# Patient Record
Sex: MALE | Race: WHITE | NOT HISPANIC OR LATINO
[De-identification: names, ages, dates, MRNs, and addresses within clinical notes are randomized per-mention and may not be internally consistent; named-entity substitution may affect disease eponyms.]

---

## 2018-03-01 ENCOUNTER — RESULT REVIEW (OUTPATIENT)
Age: 68
End: 2018-03-01

## 2018-08-13 ENCOUNTER — EMERGENCY (EMERGENCY)
Facility: HOSPITAL | Age: 68
LOS: 1 days | Discharge: HOME | End: 2018-08-13
Attending: EMERGENCY MEDICINE | Admitting: EMERGENCY MEDICINE
Payer: COMMERCIAL

## 2018-08-13 VITALS
DIASTOLIC BLOOD PRESSURE: 79 MMHG | OXYGEN SATURATION: 99 % | RESPIRATION RATE: 17 BRPM | SYSTOLIC BLOOD PRESSURE: 131 MMHG | TEMPERATURE: 98 F | WEIGHT: 166.89 LBS | HEART RATE: 58 BPM

## 2018-08-13 VITALS
HEART RATE: 61 BPM | RESPIRATION RATE: 17 BRPM | OXYGEN SATURATION: 99 % | SYSTOLIC BLOOD PRESSURE: 144 MMHG | TEMPERATURE: 98 F | DIASTOLIC BLOOD PRESSURE: 73 MMHG

## 2018-08-13 DIAGNOSIS — Z88.8 ALLERGY STATUS TO OTHER DRUGS, MEDICAMENTS AND BIOLOGICAL SUBSTANCES: ICD-10-CM

## 2018-08-13 DIAGNOSIS — E78.5 HYPERLIPIDEMIA, UNSPECIFIED: ICD-10-CM

## 2018-08-13 DIAGNOSIS — R42 DIZZINESS AND GIDDINESS: ICD-10-CM

## 2018-08-13 LAB
ALBUMIN SERPL ELPH-MCNC: 4.6 G/DL — SIGNIFICANT CHANGE UP (ref 3.3–5)
ALP SERPL-CCNC: 58 U/L — SIGNIFICANT CHANGE UP (ref 40–120)
ALT FLD-CCNC: 26 U/L — SIGNIFICANT CHANGE UP (ref 10–45)
ANION GAP SERPL CALC-SCNC: 14 MMOL/L — SIGNIFICANT CHANGE UP (ref 5–17)
AST SERPL-CCNC: 19 U/L — SIGNIFICANT CHANGE UP (ref 10–40)
BASOPHILS NFR BLD AUTO: 0.4 % — SIGNIFICANT CHANGE UP (ref 0–2)
BILIRUB SERPL-MCNC: 0.8 MG/DL — SIGNIFICANT CHANGE UP (ref 0.2–1.2)
BUN SERPL-MCNC: 20 MG/DL — SIGNIFICANT CHANGE UP (ref 7–23)
CALCIUM SERPL-MCNC: 9.5 MG/DL — SIGNIFICANT CHANGE UP (ref 8.4–10.5)
CHLORIDE SERPL-SCNC: 101 MMOL/L — SIGNIFICANT CHANGE UP (ref 96–108)
CO2 SERPL-SCNC: 22 MMOL/L — SIGNIFICANT CHANGE UP (ref 22–31)
CREAT SERPL-MCNC: 0.97 MG/DL — SIGNIFICANT CHANGE UP (ref 0.5–1.3)
EOSINOPHIL NFR BLD AUTO: 1.7 % — SIGNIFICANT CHANGE UP (ref 0–6)
GLUCOSE SERPL-MCNC: 106 MG/DL — HIGH (ref 70–99)
HCT VFR BLD CALC: 40.9 % — SIGNIFICANT CHANGE UP (ref 39–50)
HGB BLD-MCNC: 13.8 G/DL — SIGNIFICANT CHANGE UP (ref 13–17)
LYMPHOCYTES # BLD AUTO: 23.3 % — SIGNIFICANT CHANGE UP (ref 13–44)
MCHC RBC-ENTMCNC: 29.9 PG — SIGNIFICANT CHANGE UP (ref 27–34)
MCHC RBC-ENTMCNC: 33.7 G/DL — SIGNIFICANT CHANGE UP (ref 32–36)
MCV RBC AUTO: 88.7 FL — SIGNIFICANT CHANGE UP (ref 80–100)
MONOCYTES NFR BLD AUTO: 5.5 % — SIGNIFICANT CHANGE UP (ref 2–14)
NEUTROPHILS NFR BLD AUTO: 69.1 % — SIGNIFICANT CHANGE UP (ref 43–77)
PLATELET # BLD AUTO: 178 K/UL — SIGNIFICANT CHANGE UP (ref 150–400)
POTASSIUM SERPL-MCNC: 4 MMOL/L — SIGNIFICANT CHANGE UP (ref 3.5–5.3)
POTASSIUM SERPL-SCNC: 4 MMOL/L — SIGNIFICANT CHANGE UP (ref 3.5–5.3)
PROT SERPL-MCNC: 7.3 G/DL — SIGNIFICANT CHANGE UP (ref 6–8.3)
RBC # BLD: 4.61 M/UL — SIGNIFICANT CHANGE UP (ref 4.2–5.8)
RBC # FLD: 12.5 % — SIGNIFICANT CHANGE UP (ref 10.3–16.9)
SODIUM SERPL-SCNC: 137 MMOL/L — SIGNIFICANT CHANGE UP (ref 135–145)
TROPONIN T SERPL-MCNC: <0.01 NG/ML — SIGNIFICANT CHANGE UP (ref 0–0.01)
WBC # BLD: 5.3 K/UL — SIGNIFICANT CHANGE UP (ref 3.8–10.5)
WBC # FLD AUTO: 5.3 K/UL — SIGNIFICANT CHANGE UP (ref 3.8–10.5)

## 2018-08-13 PROCEDURE — 84484 ASSAY OF TROPONIN QUANT: CPT

## 2018-08-13 PROCEDURE — 99284 EMERGENCY DEPT VISIT MOD MDM: CPT

## 2018-08-13 PROCEDURE — 99284 EMERGENCY DEPT VISIT MOD MDM: CPT | Mod: 25

## 2018-08-13 PROCEDURE — 70450 CT HEAD/BRAIN W/O DYE: CPT | Mod: 26

## 2018-08-13 PROCEDURE — 70450 CT HEAD/BRAIN W/O DYE: CPT

## 2018-08-13 PROCEDURE — 82962 GLUCOSE BLOOD TEST: CPT

## 2018-08-13 PROCEDURE — 85025 COMPLETE CBC W/AUTO DIFF WBC: CPT

## 2018-08-13 PROCEDURE — 80053 COMPREHEN METABOLIC PANEL: CPT

## 2018-08-13 PROCEDURE — 36415 COLL VENOUS BLD VENIPUNCTURE: CPT

## 2018-08-13 RX ORDER — DIAZEPAM 5 MG
2 TABLET ORAL ONCE
Qty: 0 | Refills: 0 | Status: DISCONTINUED | OUTPATIENT
Start: 2018-08-13 | End: 2018-08-13

## 2018-08-13 RX ORDER — DIAZEPAM 5 MG
0.5 TABLET ORAL
Qty: 5 | Refills: 0 | OUTPATIENT
Start: 2018-08-13 | End: 2018-08-17

## 2018-08-13 RX ADMIN — Medication 2 MILLIGRAM(S): at 14:30

## 2018-08-13 NOTE — ED ADULT NURSE NOTE - NSIMPLEMENTINTERV_GEN_ALL_ED
Implemented All Universal Safety Interventions:  [employer] to call system. Call bell, personal items and telephone within reach. Instruct patient to call for assistance. Room bathroom lighting operational. Non-slip footwear when patient is off stretcher. Physically safe environment: no spills, clutter or unnecessary equipment. Stretcher in lowest position, wheels locked, appropriate side rails in place.

## 2018-08-13 NOTE — ED PROVIDER NOTE - OBJECTIVE STATEMENT
67 yoM with pmh of vertigo, thyroid nodule, gout, HLD presenting after a vertigo episode. Pt started having vertigo in July, went to PCP and was started on Valium and flexeril but took them only once given side effects of cloudiness. Pt since has been having vertigo episodes but today's episode was much worse and pt decided to come to ER. Describes as head spinning. No headache, no vision changes. Chronic hearing loss and tinnitus with no acute changes. No dysarthria or extremity numbness. Did endorse chest pain in AM when symptoms started. 67 yoM with pmh of vertigo, thyroid nodule, gout, HLD presenting after a vertigo episode. Pt started having vertigo in July, went to PCP and was started on Valium and flexeril but took them only once given side effects of cloudiness. Pt since has been having vertigo episodes progressively worsening over past 4 days, with today's episode worse and pt decided to come to ER. Describes as head spinning. Pt had previously been doing exercises as rx'd by his ENT for positional vertigo, and recently stopped exercises in past few days.   No headache, no vision changes. Chronic hearing loss and tinnitus with no acute changes. No dysarthria or extremity numbness. Did endorse chest pain in AM when symptoms started.

## 2018-08-13 NOTE — ED PROVIDER NOTE - MEDICAL DECISION MAKING DETAILS
Symptoms likely 2/2 BPPV. WIll obtain CT head to r/o structural abnormalities Symptoms likely 2/2 BPPV. WIll obtain CT head to r/o structural abnormalities.  Unlikely stroke given rapidness of on-off symptoms with head position changes.   Pt has been diagnosed w/ BPPV in past few weeks and states this is similar and has not been taking his medications due to not liking how they made him feel. vertigo type symptoms, better when he takes his rx'd meds and does his home exercises as rx'd by his ENT. However, has not done them recently and now symptoms returning and worse. No worse with neck movement, his head, but is worse when sitting up or laying back. Symptoms likely 2/2 BPPV as improved w/ his previous medications. WIll obtain CT head to r/o structural abnormalities.  Unlikely stroke given rapidness of on-off symptoms with head position changes.   Pt has been diagnosed w/ BPPV in past few weeks and states this is similar and has not been taking his medications due to not liking how they made him feel. I think it is less likely this is a posterior circulation or stroke given pt has had these symptoms intermittently for months, the improve w/ his BPPV meds and exercises, and they are acutely worse at onset then improve after short bit. Does not have symptoms at rest.

## 2018-08-13 NOTE — ED PROVIDER NOTE - PHYSICAL EXAMINATION
Constitutional: WDWN resting comfortably in bed; NAD  Head: NC/AT  Eyes: PERRL, EOMI, clear conjunctiva  ENT: no nasal discharge; uvula midline, no oropharyngeal erythema or exudates; MMM  Neck: supple; no JVD or thyromegaly  Respiratory: CTA B/L; no W/R/R, no retractions  Cardiac: +S1/S2; RRR; no M/R/G; PMI non-displaced  Gastrointestinal: soft, NT/ND; no rebound or guarding; +BSx4  Genitourinary: normal external genitalia  Back: spine midline, no bony tenderness or step-offs; no CVAT B/L  Extremities: WWP, no clubbing or cyanosis; no peripheral edema  Musculoskeletal: NROM x4; no joint swelling, tenderness or erythema  Vascular: 2+ radial, femoral, DP/PT pulses B/L  Dermatologic: skin warm, dry and intact; no rashes, wounds, or scars  Lymphatic: no submandibular or cervical LAD  Neurologic: AAOx3; CNII-XII grossly intact; no nystagmus. Dizziness worse with neck motion. Dizziness worse with standing. no other focal deficits.   Psychiatric: affect and characteristics of appearance, verbalizations, behaviors are appropriate Constitutional: WDWN resting comfortably in bed; NAD  Head: NC/AT  Eyes: PERRL, EOMI, clear conjunctiva  ENT: no nasal discharge; uvula midline, no oropharyngeal erythema or exudates; MMM  Neck: supple; no JVD or thyromegaly  Respiratory: CTA B/L; no W/R/R, no retractions  Cardiac: +S1/S2; RRR; no M/R/G; PMI non-displaced  Gastrointestinal: soft, NT/ND; no rebound or guarding; +BSx4  Genitourinary: normal external genitalia  Back: spine midline, no bony tenderness or step-offs; no CVAT B/L  Extremities: WWP, no clubbing or cyanosis; no peripheral edema  Musculoskeletal: NROM x4; no joint swelling, tenderness or erythema  Vascular: 2+ radial, femoral, DP/PT pulses B/L  Dermatologic: skin warm, dry and intact; no rashes, wounds, or scars  Lymphatic: no submandibular or cervical LAD  Neurologic: AAOx3; CNII-XII grossly intact; no nystagmus. Dizziness worse with neck motion. Dizziness worse with standing. no other focal deficits. Is able to ambulate with minimal acceptance.   Psychiatric: affect and characteristics of appearance, verbalizations, behaviors are appropriate

## 2018-08-13 NOTE — ED ADULT NURSE NOTE - OBJECTIVE STATEMENT
68 y/o male c/o dizziness since 1025am this morning. reports he felt like his head was spinning and he had mild nausea at the time with no vomit. denies headache, vision changes NAD, VSS, speaking in full sentences, neuro/sensory intact, face symmetrical, gait steady. pmhx vertigo.

## 2018-08-13 NOTE — ED ADULT TRIAGE NOTE - CHIEF COMPLAINT QUOTE
Pt c/o worsening dizziness "my head is spinning" and feeling unsteady when walking since 1030am. Pt states he's 70% deaf in both ears and has been having dizziness for two months, put on Valium and another medication but it didn't help. Also c/o chest discomfort in triage. Pt states today felt worse than he's ever had. FSG 95

## 2018-08-24 PROBLEM — Z00.00 ENCOUNTER FOR PREVENTIVE HEALTH EXAMINATION: Status: ACTIVE | Noted: 2018-08-24

## 2018-10-12 ENCOUNTER — APPOINTMENT (OUTPATIENT)
Dept: NEUROLOGY | Facility: CLINIC | Age: 68
End: 2018-10-12

## 2019-08-08 NOTE — ED ADULT NURSE NOTE - NSFALLRSKUNASSIST_ED_ALL_ED
Physical Therapy Daily Treatment    Visit Count: 4/9   Plan of Care: 7/29/2019 Through: 12/16/2019  Insurance Information:   WEA  Evaluation Only    9 VISITS FROM 7/29/19-9/27/19 WHICH INCLUDES THE EVAL. Referred by:  Cristino Cordero DO; Next provider visit (if known/scheduled): 8/5/19  Medical Diagnosis (from order):       Diagnosis Information    Â       Â  Diagnosis    Â  V58.89 (ICD-9-CM) - S83.232D (ICD-10-CM) - Complex tear of medial meniscus of left knee as current injury, subsequent encounter    Â       Â    Â   Treatment Diagnosis: knee symptoms with increased pain/symptoms, impaired posture, impaired strength, impaired range of motion, impaired muscle length/flexibility, increased swelling, impaired joint mobility/play, impaired gait, impaired activity tolerance  Â   Date of Surgery: 7/25/2019 Surgery Performed: Left knee arthroscopy with partial medial menisectomy, chondroplasty and synovectomy   - Left and Meniscectomy - Left  Physician Guidelines/Precautions: none       SUBJECTIVE     Current Pain (0-10 scale): 5/10  Reports increased left knee soreness today  Reports left knee felt pretty good after last session  Improved sleeping ability last night    OBJECTIVE       Treatment   Therapeutic Exercise:   Recumbent bike full revolutions 6 minutes seat position 9  Slant board calf stretch  Leg press left Lower Extremity 40, 55 pounds   Standing hip abduction each Lower Extremity against yellow theraband  Side stepping against yellow theraband   Stair calf stretch  Leg press left Lower Extremity 40, 55 pounds -- cues on form and positioning  Wall sits -- added to home exercise program   Partial squats   4 inch steps downs    Neuromuscular Reeducation:  Single leg balance   Tandem standing eyes closed   AirEx standing balance narrow base of support eyes closed   AirEx Single leg balance right and left Lower Extremity   Blue fitter board medial and lateral / balance  White short balance board medial and lateral / balance     Skilled input: verbal instruction/cues form and execution of exercise; modified patient's home exercise program below    Home Program:     straight leg raise   heel slides   Short arc quadriceps       Bike  Hamstring heel cord strap stretch  Single leg balance with progression to opposite leg swings and eyes closed   Wall sits  Tandem standing balance eyes closed   Partial squats  Side stepping against theraband   Standing hip abduction against theraband      Writer verbally educated the patient and received verbal consent from the patient on hand placement, positioning of patient, and techniques to be performed today including hand placement and palpation for techniques as described above and how they are pertinent to the patient's plan of care. Suggestions for next session as indicated:  Progress with closed kinetic chain strengthening   Passive Range of Motion   Hip strengthening   Proprioceptive training    ASSESSMENT   Proprioceptive control improved during today's session  Improved exercise tolerance   quadriceps fatigue noted  symptoms decreased during session  Pain after treatment (patient reported, 0-10 scale): 0  Result of above outlined education: Verbalizes understanding and Demonstrates understanding   Handout provided of patient's home exercise program.      THERAPY DAILY BILLING   Insurance: WEA 2.  N/A    Evaluation Procedures:  No evaluation codes were used on this date of service    Timed Procedures:  Neuromuscular Re-Education, 30 minutes  Therapeutic Exercise, 15 minutes    Untimed Procedures:  No untimed codes were used on this date of service    Total Treatment Time: 45 minutes no

## 2019-09-11 PROBLEM — M10.9 GOUT, UNSPECIFIED: Chronic | Status: ACTIVE | Noted: 2018-08-13

## 2019-09-11 PROBLEM — R42 DIZZINESS AND GIDDINESS: Chronic | Status: ACTIVE | Noted: 2018-08-13

## 2019-09-11 PROBLEM — E04.1 NONTOXIC SINGLE THYROID NODULE: Chronic | Status: ACTIVE | Noted: 2018-08-13

## 2019-09-11 PROBLEM — E78.5 HYPERLIPIDEMIA, UNSPECIFIED: Chronic | Status: ACTIVE | Noted: 2018-08-13

## 2019-09-13 ENCOUNTER — APPOINTMENT (OUTPATIENT)
Dept: COLORECTAL SURGERY | Facility: CLINIC | Age: 69
End: 2019-09-13
Payer: MEDICARE

## 2019-09-13 VITALS
SYSTOLIC BLOOD PRESSURE: 116 MMHG | BODY MASS INDEX: 25.01 KG/M2 | HEIGHT: 68 IN | DIASTOLIC BLOOD PRESSURE: 72 MMHG | RESPIRATION RATE: 14 BRPM | HEART RATE: 53 BPM | WEIGHT: 165 LBS

## 2019-09-13 DIAGNOSIS — Z78.9 OTHER SPECIFIED HEALTH STATUS: ICD-10-CM

## 2019-09-13 PROCEDURE — 46600 DIAGNOSTIC ANOSCOPY SPX: CPT

## 2019-09-13 PROCEDURE — 99204 OFFICE O/P NEW MOD 45 MIN: CPT | Mod: 25

## 2019-09-14 PROBLEM — Z78.9 NON-SMOKER: Status: ACTIVE | Noted: 2019-09-14

## 2019-09-14 NOTE — ASSESSMENT
[FreeTextEntry1] : 68-year-old male with internal hemorrhoids. Recommend conservative therapy,recommend high fiber diet, metamucil daily, stool softners, sitzs baths, hydrocortisone cream and suppositories prn

## 2019-09-14 NOTE — PHYSICAL EXAM
[Abdomen Masses] : No abdominal masses [Abdomen Tenderness] : ~T No ~M abdominal tenderness [Tender] : nontender [Normal rectal exam] : exam was normal [Tender, Swollen] : tender, swollen [Manually Reducible] : a manually reducible (grade III) [Normal] : was normal [JVD] : no jugular venous distention  [None] : there was no rectal mass  [Normal Breath Sounds] : Normal breath sounds [Normal Heart Sounds] : normal heart sounds [No Rash or Lesion] : No rash or lesion [Normal Rate and Rhythm] : normal rate and rhythm [Oriented to Person] : oriented to person [Alert] : alert [Oriented to Time] : oriented to time [Oriented to Place] : oriented to place [de-identified] : Looks well in no distress, of stated age. [Calm] : calm [de-identified] : pupils equal reactive to light normocephalic atraumatic.

## 2019-09-14 NOTE — HISTORY OF PRESENT ILLNESS
[FreeTextEntry1] : 68 yr old male with complaints of internal and external hemorroids with bleeding for 2 months. bleeding is bright red with bowel movements

## 2019-09-23 ENCOUNTER — OTHER (OUTPATIENT)
Age: 69
End: 2019-09-23

## 2019-09-23 RX ORDER — HYDROCORTISONE 25 MG/G
2.5 CREAM TOPICAL TWICE DAILY
Qty: 1 | Refills: 3 | Status: ACTIVE | COMMUNITY
Start: 2019-09-23 | End: 1900-01-01

## 2019-09-23 RX ORDER — HYDROCORTISONE ACETATE 25 MG/1
25 SUPPOSITORY RECTAL
Qty: 14 | Refills: 3 | Status: DISCONTINUED | COMMUNITY
Start: 2019-09-13 | End: 2019-09-23

## 2019-12-17 ENCOUNTER — APPOINTMENT (OUTPATIENT)
Dept: COLORECTAL SURGERY | Facility: CLINIC | Age: 69
End: 2019-12-17
Payer: MEDICARE

## 2019-12-17 VITALS
BODY MASS INDEX: 25.16 KG/M2 | SYSTOLIC BLOOD PRESSURE: 115 MMHG | DIASTOLIC BLOOD PRESSURE: 73 MMHG | RESPIRATION RATE: 14 BRPM | HEIGHT: 68 IN | WEIGHT: 166 LBS | HEART RATE: 78 BPM

## 2019-12-17 DIAGNOSIS — Z82.0 FAMILY HISTORY OF EPILEPSY AND OTHER DISEASES OF THE NERVOUS SYSTEM: ICD-10-CM

## 2019-12-17 DIAGNOSIS — Z87.19 PERSONAL HISTORY OF OTHER DISEASES OF THE DIGESTIVE SYSTEM: ICD-10-CM

## 2019-12-17 DIAGNOSIS — K64.9 UNSPECIFIED HEMORRHOIDS: ICD-10-CM

## 2019-12-17 DIAGNOSIS — Z80.9 FAMILY HISTORY OF MALIGNANT NEOPLASM, UNSPECIFIED: ICD-10-CM

## 2019-12-17 DIAGNOSIS — Z87.39 PERSONAL HISTORY OF OTHER DISEASES OF THE MUSCULOSKELETAL SYSTEM AND CONNECTIVE TISSUE: ICD-10-CM

## 2019-12-17 DIAGNOSIS — Z12.11 ENCOUNTER FOR SCREENING FOR MALIGNANT NEOPLASM OF COLON: ICD-10-CM

## 2019-12-17 PROCEDURE — 99214 OFFICE O/P EST MOD 30 MIN: CPT | Mod: 25

## 2019-12-17 PROCEDURE — 46600 DIAGNOSTIC ANOSCOPY SPX: CPT

## 2019-12-17 RX ORDER — UBIDECARENONE/VIT E ACET 100MG-5
CAPSULE ORAL
Refills: 0 | Status: ACTIVE | COMMUNITY

## 2019-12-17 RX ORDER — SIMVASTATIN 80 MG/1
TABLET, FILM COATED ORAL
Refills: 0 | Status: ACTIVE | COMMUNITY

## 2019-12-17 RX ORDER — ALLOPURINOL 300 MG/1
300 TABLET ORAL
Refills: 0 | Status: ACTIVE | COMMUNITY

## 2019-12-17 NOTE — ASSESSMENT
[FreeTextEntry1] : 69-year-old male with internal hemorrhoids. Recommend conservative therapy,recommend high fiber diet, metamucil daily, stool softners, sitzs baths, hydrocortisone cream and suppositories prn. Recommend colonoscopy. Risks and benefits of colonoscopy have been discussed which include but not limited to bleeding, perforation, missing a cancer or polyp occurring 5%.\par

## 2019-12-17 NOTE — PHYSICAL EXAM
[Abdomen Tenderness] : ~T No ~M abdominal tenderness [Abdomen Masses] : No abdominal masses [Tender] : nontender [Normal rectal exam] : exam was normal [Manually Reducible] : a manually reducible (grade III) [Tender, Swollen] : tender, swollen [Normal] : was normal [None] : there was no rectal abscess [JVD] : no jugular venous distention  [Normal Breath Sounds] : Normal breath sounds [Normal Rate and Rhythm] : normal rate and rhythm [No Rash or Lesion] : No rash or lesion [Normal Heart Sounds] : normal heart sounds [Oriented to Person] : oriented to person [Alert] : alert [Oriented to Place] : oriented to place [Oriented to Time] : oriented to time [de-identified] : pupils equal reactive to light normocephalic atraumatic. [de-identified] : Looks well in no distress, of stated age. [Calm] : calm

## 2019-12-17 NOTE — HISTORY OF PRESENT ILLNESS
[FreeTextEntry1] : 69 yr old male with complaints of internal and external hemorroids with bleeding resolved.

## 2020-03-03 ENCOUNTER — APPOINTMENT (OUTPATIENT)
Dept: COLORECTAL SURGERY | Facility: CLINIC | Age: 70
End: 2020-03-03
Payer: MEDICARE

## 2020-03-03 PROCEDURE — 45380 COLONOSCOPY AND BIOPSY: CPT

## 2020-07-02 ENCOUNTER — APPOINTMENT (OUTPATIENT)
Dept: COLORECTAL SURGERY | Facility: CLINIC | Age: 70
End: 2020-07-02
Payer: MEDICARE

## 2020-07-02 VITALS
WEIGHT: 166 LBS | TEMPERATURE: 98 F | SYSTOLIC BLOOD PRESSURE: 124 MMHG | HEIGHT: 68 IN | HEART RATE: 77 BPM | DIASTOLIC BLOOD PRESSURE: 70 MMHG | BODY MASS INDEX: 25.16 KG/M2

## 2020-07-02 DIAGNOSIS — R14.0 ABDOMINAL DISTENSION (GASEOUS): ICD-10-CM

## 2020-07-02 PROCEDURE — 99214 OFFICE O/P EST MOD 30 MIN: CPT

## 2020-07-02 NOTE — DATA REVIEWED
[FreeTextEntry1] : I reviewed his hospital records from recent ER visit.  His CBC and BMP were unremarkable.  His hemoglobin was 13.8.

## 2020-07-02 NOTE — PHYSICAL EXAM
[Exam Deferred] : exam was deferred [Calm] : calm [de-identified] : Soft, non-tender, mild distension [Respiratory Effort] : normal respiratory effort [de-identified] : Well-appearing, in no distress [de-identified] : Alert and oriented x3 [de-identified] : Normocephalic, atraumatic [de-identified] : Ambulating with walker [de-identified] : Warm and dry

## 2020-07-02 NOTE — HISTORY OF PRESENT ILLNESS
[FreeTextEntry1] : 69-year-old male who presents for evaluation of gastrointestinal symptoms.  He underwent a left knee replacement at [facility] 1 week ago.  Since then he has suffered from issues of constipation and changes in bowel function.  He also describes extreme fatigue.  He reports abdominal bloating after meals.  He has tried multiple bowel regimens and is now taking Linzess.  He is was initially constipated but is currently having diarrhea.  He was seen in the emergency room at [place] where he was diagnosed with gastritis.  He had a colonoscopy in March 2020 and small polyps were removed moved.  Denies rectal bleeding or pain

## 2023-07-11 ENCOUNTER — OFFICE (OUTPATIENT)
Dept: URBAN - METROPOLITAN AREA CLINIC 88 | Facility: CLINIC | Age: 73
Setting detail: OPHTHALMOLOGY
End: 2023-07-11
Payer: MEDICARE

## 2023-07-11 DIAGNOSIS — H43.393: ICD-10-CM

## 2023-07-11 DIAGNOSIS — H35.3131: ICD-10-CM

## 2023-07-11 DIAGNOSIS — H17.9: ICD-10-CM

## 2023-07-11 DIAGNOSIS — H40.013: ICD-10-CM

## 2023-07-11 DIAGNOSIS — H35.373: ICD-10-CM

## 2023-07-11 PROCEDURE — 92250 FUNDUS PHOTOGRAPHY W/I&R: CPT | Performed by: OPTOMETRIST

## 2023-07-11 PROCEDURE — 92014 COMPRE OPH EXAM EST PT 1/>: CPT | Performed by: OPTOMETRIST

## 2023-07-11 ASSESSMENT — REFRACTION_AUTOREFRACTION
OD_SPHERE: PLANO
OD_CYLINDER: -0.25
OD_AXIS: 027
OS_SPHERE: +0.50
OS_AXIS: 017
OS_CYLINDER: -0.75

## 2023-07-11 ASSESSMENT — KERATOMETRY
OD_K1POWER_DIOPTERS: 43.25
METHOD_AUTO_MANUAL: AUTO
OS_K1POWER_DIOPTERS: 43.50
OD_AXISANGLE_DEGREES: 099
OD_K2POWER_DIOPTERS: 43.50
OS_K2POWER_DIOPTERS: 44.25
OS_AXISANGLE_DEGREES: 094

## 2023-07-11 ASSESSMENT — REFRACTION_CURRENTRX
OD_AXIS: 000
OD_VPRISM_DIRECTION: SV
OS_AXIS: 000
OS_VPRISM_DIRECTION: SV
OD_OVR_VA: 20/
OS_SPHERE: +2.25
OD_SPHERE: +2.25
OD_CYLINDER: SPHERE
OS_OVR_VA: 20/
OS_CYLINDER: SPHERE

## 2023-07-11 ASSESSMENT — PACHYMETRY
OD_CT_UM: 569
OS_CT_UM: 572
OS_CT_CORRECTION: -2
OD_CT_CORRECTION: -1

## 2023-07-11 ASSESSMENT — TONOMETRY
OD_IOP_MMHG: 16
OS_IOP_MMHG: 17

## 2023-07-11 ASSESSMENT — SPHEQUIV_DERIVED: OS_SPHEQUIV: 0.125

## 2023-07-11 ASSESSMENT — VISUAL ACUITY
OS_BCVA: 20/25-1
OD_BCVA: 20/20-2

## 2023-07-11 ASSESSMENT — CONFRONTATIONAL VISUAL FIELD TEST (CVF)
OS_FINDINGS: FULL
OD_FINDINGS: FULL

## 2023-07-11 ASSESSMENT — AXIALLENGTH_DERIVED: OS_AL: 23.4069

## 2023-11-27 ENCOUNTER — OFFICE (OUTPATIENT)
Dept: URBAN - METROPOLITAN AREA CLINIC 100 | Facility: CLINIC | Age: 73
Setting detail: OPHTHALMOLOGY
End: 2023-11-27
Payer: MEDICARE

## 2023-11-27 DIAGNOSIS — H40.013: ICD-10-CM

## 2023-11-27 DIAGNOSIS — H17.9: ICD-10-CM

## 2023-11-27 DIAGNOSIS — H11.421: ICD-10-CM

## 2023-11-27 DIAGNOSIS — Z96.1: ICD-10-CM

## 2023-11-27 PROCEDURE — 92012 INTRM OPH EXAM EST PATIENT: CPT | Performed by: OPHTHALMOLOGY

## 2023-11-27 ASSESSMENT — REFRACTION_AUTOREFRACTION
OD_SPHERE: PLANO
OS_CYLINDER: -0.75
OD_CYLINDER: -0.50
OS_SPHERE: +0.50
OD_AXIS: 093
OS_AXIS: 035

## 2023-11-27 ASSESSMENT — REFRACTION_CURRENTRX
OD_VPRISM_DIRECTION: SV
OS_CYLINDER: SPHERE
OD_OVR_VA: 20/
OS_OVR_VA: 20/
OD_SPHERE: +2.25
OS_SPHERE: +2.25
OS_AXIS: 000
OS_VPRISM_DIRECTION: SV
OD_CYLINDER: SPHERE
OD_AXIS: 000

## 2023-11-27 ASSESSMENT — SPHEQUIV_DERIVED: OS_SPHEQUIV: 0.125

## 2023-11-27 ASSESSMENT — CONFRONTATIONAL VISUAL FIELD TEST (CVF)
OD_FINDINGS: FULL
OS_FINDINGS: FULL

## 2023-12-11 ENCOUNTER — OFFICE (OUTPATIENT)
Dept: URBAN - METROPOLITAN AREA CLINIC 100 | Facility: CLINIC | Age: 73
Setting detail: OPHTHALMOLOGY
End: 2023-12-11
Payer: MEDICARE

## 2023-12-11 DIAGNOSIS — Z96.1: ICD-10-CM

## 2023-12-11 DIAGNOSIS — H40.013: ICD-10-CM

## 2023-12-11 DIAGNOSIS — H17.9: ICD-10-CM

## 2023-12-11 DIAGNOSIS — H11.421: ICD-10-CM

## 2023-12-11 DIAGNOSIS — H35.361: ICD-10-CM

## 2023-12-11 PROCEDURE — 99213 OFFICE O/P EST LOW 20 MIN: CPT | Performed by: OPHTHALMOLOGY

## 2023-12-11 ASSESSMENT — REFRACTION_CURRENTRX
OD_OVR_VA: 20/
OD_VPRISM_DIRECTION: SV
OS_OVR_VA: 20/
OD_SPHERE: +2.00
OS_SPHERE: +2.00
OS_VPRISM_DIRECTION: SV

## 2023-12-11 ASSESSMENT — REFRACTION_AUTOREFRACTION
OS_CYLINDER: -0.75
OD_SPHERE: PLANO
OS_AXIS: 040
OS_SPHERE: +0.50
OD_CYLINDER: -0.50
OD_AXIS: 095

## 2023-12-11 ASSESSMENT — CONFRONTATIONAL VISUAL FIELD TEST (CVF)
OS_FINDINGS: FULL
OD_FINDINGS: FULL

## 2023-12-11 ASSESSMENT — SPHEQUIV_DERIVED: OS_SPHEQUIV: 0.125

## 2023-12-27 ENCOUNTER — RX ONLY (RX ONLY)
Age: 73
End: 2023-12-27

## 2023-12-27 ENCOUNTER — OFFICE (OUTPATIENT)
Dept: URBAN - METROPOLITAN AREA CLINIC 100 | Facility: CLINIC | Age: 73
Setting detail: OPHTHALMOLOGY
End: 2023-12-27
Payer: MEDICARE

## 2023-12-27 DIAGNOSIS — H11.421: ICD-10-CM

## 2023-12-27 DIAGNOSIS — H17.9: ICD-10-CM

## 2023-12-27 DIAGNOSIS — T15.11XA: ICD-10-CM

## 2023-12-27 PROBLEM — T15.11XD CONJUNCTIVAL FOREIGN BODY ; RIGHT EYE: Status: ACTIVE | Noted: 2023-12-27

## 2023-12-27 PROBLEM — H35.371 EPIRETINAL MEMBRANE;  , RIGHT EYE: Status: ACTIVE | Noted: 2023-12-11

## 2023-12-27 PROBLEM — T15.12XS CONJUNCTIVAL FOREIGN BODY ; RIGHT EYE: Status: ACTIVE | Noted: 2023-12-27

## 2023-12-27 PROBLEM — T15.11XS CONJUNCTIVAL FOREIGN BODY ; RIGHT EYE: Status: ACTIVE | Noted: 2023-12-27

## 2023-12-27 PROBLEM — T15.12XA CONJUNCTIVAL FOREIGN BODY ; RIGHT EYE: Status: ACTIVE | Noted: 2023-12-27

## 2023-12-27 PROBLEM — T15.12XD CONJUNCTIVAL FOREIGN BODY ; RIGHT EYE: Status: ACTIVE | Noted: 2023-12-27

## 2023-12-27 PROBLEM — H43.391 VITREOUS FLOATERS;  , RIGHT EYE: Status: ACTIVE | Noted: 2023-12-11

## 2023-12-27 PROCEDURE — 99213 OFFICE O/P EST LOW 20 MIN: CPT | Mod: 25 | Performed by: OPHTHALMOLOGY

## 2023-12-27 PROCEDURE — 65210 REMOVE FOREIGN BODY FROM EYE: CPT | Mod: RT | Performed by: OPHTHALMOLOGY

## 2023-12-27 ASSESSMENT — CONFRONTATIONAL VISUAL FIELD TEST (CVF)
OS_FINDINGS: FULL
OD_FINDINGS: FULL

## 2023-12-27 ASSESSMENT — REFRACTION_CURRENTRX
OD_SPHERE: +2.00
OS_OVR_VA: 20/
OS_VPRISM_DIRECTION: SV
OD_VPRISM_DIRECTION: SV
OS_SPHERE: +2.00
OD_OVR_VA: 20/

## 2023-12-27 ASSESSMENT — SPHEQUIV_DERIVED
OD_SPHEQUIV: -0.625
OS_SPHEQUIV: -0.125

## 2023-12-27 ASSESSMENT — REFRACTION_AUTOREFRACTION
OS_AXIS: 035
OS_SPHERE: +0.25
OD_AXIS: 092
OD_CYLINDER: -0.25
OD_SPHERE: -0.50
OS_CYLINDER: -0.75

## 2024-01-04 ENCOUNTER — OFFICE (OUTPATIENT)
Dept: URBAN - METROPOLITAN AREA CLINIC 100 | Facility: CLINIC | Age: 74
Setting detail: OPHTHALMOLOGY
End: 2024-01-04
Payer: MEDICARE

## 2024-01-04 ENCOUNTER — RX ONLY (RX ONLY)
Age: 74
End: 2024-01-04

## 2024-01-04 DIAGNOSIS — H11.421: ICD-10-CM

## 2024-01-04 DIAGNOSIS — H17.9: ICD-10-CM

## 2024-01-04 DIAGNOSIS — Z96.1: ICD-10-CM

## 2024-01-04 PROCEDURE — 92012 INTRM OPH EXAM EST PATIENT: CPT | Performed by: OPHTHALMOLOGY

## 2024-01-04 ASSESSMENT — REFRACTION_AUTOREFRACTION
OS_CYLINDER: -0.75
OD_AXIS: 095
OD_CYLINDER: -0.25
OD_SPHERE: -0.25
OS_SPHERE: +0.50
OS_AXIS: 031

## 2024-01-04 ASSESSMENT — REFRACTION_CURRENTRX
OD_SPHERE: +2.00
OS_VPRISM_DIRECTION: SV
OD_OVR_VA: 20/
OD_VPRISM_DIRECTION: SV
OS_OVR_VA: 20/
OS_SPHERE: +2.00

## 2024-01-04 ASSESSMENT — CONFRONTATIONAL VISUAL FIELD TEST (CVF)
OS_FINDINGS: FULL
OD_FINDINGS: FULL

## 2024-01-04 ASSESSMENT — SPHEQUIV_DERIVED
OS_SPHEQUIV: 0.125
OD_SPHEQUIV: -0.375

## 2024-07-21 ENCOUNTER — OFFICE (OUTPATIENT)
Dept: URBAN - METROPOLITAN AREA CLINIC 12 | Facility: CLINIC | Age: 74
Setting detail: OPHTHALMOLOGY
End: 2024-07-21
Payer: MEDICARE

## 2024-07-21 DIAGNOSIS — H35.373: ICD-10-CM

## 2024-07-21 DIAGNOSIS — H43.391: ICD-10-CM

## 2024-07-21 DIAGNOSIS — H40.013: ICD-10-CM

## 2024-07-21 PROBLEM — Q14.1 CHRPE LESION: Status: ACTIVE | Noted: 2024-07-21

## 2024-07-21 PROCEDURE — 92083 EXTENDED VISUAL FIELD XM: CPT | Performed by: OPTOMETRIST

## 2024-07-21 PROCEDURE — 92014 COMPRE OPH EXAM EST PT 1/>: CPT | Performed by: OPTOMETRIST

## 2024-07-21 PROCEDURE — 92250 FUNDUS PHOTOGRAPHY W/I&R: CPT | Performed by: OPTOMETRIST

## 2024-07-21 ASSESSMENT — CONFRONTATIONAL VISUAL FIELD TEST (CVF)
OD_FINDINGS: FULL
OS_FINDINGS: FULL

## 2025-07-05 ENCOUNTER — OFFICE (OUTPATIENT)
Dept: URBAN - METROPOLITAN AREA CLINIC 12 | Facility: CLINIC | Age: 75
Setting detail: OPHTHALMOLOGY
End: 2025-07-05
Payer: MEDICARE

## 2025-07-05 DIAGNOSIS — H35.3131: ICD-10-CM

## 2025-07-05 DIAGNOSIS — H40.013: ICD-10-CM

## 2025-07-05 DIAGNOSIS — H35.373: ICD-10-CM

## 2025-07-05 DIAGNOSIS — H43.391: ICD-10-CM

## 2025-07-05 PROCEDURE — 92014 COMPRE OPH EXAM EST PT 1/>: CPT | Performed by: OPTOMETRIST

## 2025-07-05 PROCEDURE — 92250 FUNDUS PHOTOGRAPHY W/I&R: CPT | Performed by: OPTOMETRIST

## 2025-07-05 ASSESSMENT — KERATOMETRY
OS_AXISANGLE_DEGREES: 129
OS_K2POWER_DIOPTERS: 44.50
OD_K2POWER_DIOPTERS: 44.00
OS_K1POWER_DIOPTERS: 43.75
OD_AXISANGLE_DEGREES: 175
OD_K1POWER_DIOPTERS: 43.00
METHOD_AUTO_MANUAL: AUTO

## 2025-07-05 ASSESSMENT — REFRACTION_AUTOREFRACTION
OS_SPHERE: +0.50
OS_AXIS: 016
OD_CYLINDER: -0.75
OD_AXIS: 080
OS_CYLINDER: -0.75
OD_SPHERE: +0.25

## 2025-07-05 ASSESSMENT — CONFRONTATIONAL VISUAL FIELD TEST (CVF)
OS_FINDINGS: FULL
OD_FINDINGS: FULL

## 2025-07-05 ASSESSMENT — PACHYMETRY
OD_CT_UM: 569
OS_CT_UM: 572
OS_CT_CORRECTION: -2
OD_CT_CORRECTION: -1

## 2025-07-05 ASSESSMENT — VISUAL ACUITY
OD_BCVA: 20/25-2
OS_BCVA: 20/40

## 2025-07-05 ASSESSMENT — TONOMETRY: OS_IOP_MMHG: 10
